# Patient Record
(demographics unavailable — no encounter records)

---

## 2025-05-30 NOTE — ASSESSMENT
[Arterial/Venous Disease] : arterial/venous disease [FreeTextEntry1] : 79yoM, former smoker with PMHx of CAD/PCI, AFib on Eliquis, PAD, s/p RLE PTA/stents, chronic LBP radiating down to his lower extremities referred by one of our patients to be evaluated for PAD. Pt with burning sensation of his bilateral LEs, R>L. Denies claudication or rest pain. On exam, both legs are well perfused, no edema or skin discoloration, diminished peripheral pulses. BL LEs arterial duplex was done in the office that demonstrating diffuse fibrocalcific plaque in femoropopliteal and tibial arteries BL. RLE with patent popliteal stent, AT is not visualized. LLE with >50% stenosis of distal SFA, popliteal artery with >75% stenosis, AT is occluded.  Discussed the findings and explained that symptoms are not vascular and most likely related to his lower back. No vascular intervention is needed at this time. He was recommended to f/u with his spine surgeon and pain management. F/u in 6 months for a surveillance duplex or sooner if necessary.

## 2025-05-30 NOTE — ADDENDUM
[FreeTextEntry1] : This note was written by Alejandrina VALDEZ, acting as a scribe for Dr. Jeferson Campbell.  I, Dr. Jeferson Campbell, have read and attest that all the information, medical decision-making, and discharge instructions within are true and accurate.  I, Dr. Jeferson Campbell, personally performed the evaluation and management (E/M) services for this new patient.  That E/M includes conducting the initial examination, assessing all conditions, and establishing the plan of care.  Today, my ACP, Alejandrina VALDEZ, was here to observe my evaluation and management services for this patient to be followed going forward.

## 2025-05-30 NOTE — HISTORY OF PRESENT ILLNESS
[FreeTextEntry1] : 79yoM, former smoker with PMHx of CAD/PCI, AFib on Eliquis, PAD, s/p RLE PTA/stents, chronic LBP radiating down to his lower extremities referred by one of our patients to be evaluated for PAD. He c/o burning leg pain that rapidly progressed over the last few months, R>L. He reports a history of RLE popliteal stent few years ago and states that he had similar symptoms at that time. He was prescribed Gabapentin and tramadol by his PCP, however his symptoms are not relieved. He recently had an epidural injection and symptoms in his left leg were relieved, however it didnt help with his right leg burning sensation. He denies claudication, rest pain, skin changes. No hx of DVT, edema.  Pt primarily lives in [place], takes care of his wife who suffers from Alzheimer's Bed in low position, brakes on/Side rails x 2 or 4 up, assess large gaps, such that a patient could get extremity or other body part entrapped, use additional safety procedures/Call light is within reach, educate patient/family on its functionality

## 2025-05-30 NOTE — REVIEW OF SYSTEMS
[As Noted in HPI] : as noted in HPI [Arthralgias] : arthralgias [Limb Pain] : limb pain [Negative] : Heme/Lymph [Leg Claudication] : no intermittent leg claudication [Lower Ext Edema] : no extremity edema

## 2025-05-30 NOTE — PHYSICAL EXAM
[Respiratory Effort] : normal respiratory effort [Normal Heart Sounds] : normal heart sounds [No Rash or Lesion] : No rash or lesion [Calm] : calm [2+] : left 2+ [0] : right 0 [1+] : left 1+ [Ankle Swelling (On Exam)] : not present [Abdomen Tenderness] : ~T ~M No abdominal tenderness [de-identified] : WN/WD, NAD [de-identified] : NC/AT [de-identified] : supple [de-identified] : FROM throughout [de-identified] : grossly intact

## 2025-05-30 NOTE — HISTORY OF PRESENT ILLNESS
[FreeTextEntry1] : 79yoM, former smoker with PMHx of CAD/PCI, AFib on Eliquis, PAD, s/p RLE PTA/stents, chronic LBP radiating down to his lower extremities referred by one of our patients to be evaluated for PAD. He c/o burning leg pain that rapidly progressed over the last few months, R>L. He reports a history of RLE popliteal stent few years ago and states that he had similar symptoms at that time. He was prescribed Gabapentin and tramadol by his PCP, however his symptoms are not relieved. He recently had an epidural injection and symptoms in his left leg were relieved, however it didnt help with his right leg burning sensation. He denies claudication, rest pain, skin changes. No hx of DVT, edema.  Pt primarily lives in [place], takes care of his wife who suffers from Alzheimer's

## 2025-05-30 NOTE — PHYSICAL EXAM
[Respiratory Effort] : normal respiratory effort [Normal Heart Sounds] : normal heart sounds [No Rash or Lesion] : No rash or lesion [Calm] : calm [2+] : left 2+ [0] : right 0 [1+] : left 1+ [Ankle Swelling (On Exam)] : not present [Abdomen Tenderness] : ~T ~M No abdominal tenderness [de-identified] : WN/WD, NAD [de-identified] : NC/AT [de-identified] : supple [de-identified] : FROM throughout [de-identified] : grossly intact

## 2025-05-30 NOTE — PHYSICAL EXAM
[Respiratory Effort] : normal respiratory effort [Normal Heart Sounds] : normal heart sounds [No Rash or Lesion] : No rash or lesion [Calm] : calm [2+] : left 2+ [0] : right 0 [1+] : left 1+ [Ankle Swelling (On Exam)] : not present [Abdomen Tenderness] : ~T ~M No abdominal tenderness [de-identified] : WN/WD, NAD [de-identified] : NC/AT [de-identified] : supple [de-identified] : FROM throughout [de-identified] : grossly intact

## 2025-05-30 NOTE — PROCEDURE
[FreeTextEntry1] : BL LEs arterial duplex was done in the office that demonstrating diffuse fibrocalcific plaque in femoropopliteal and tibial arteries BL. RLE with patent popliteal stent, AT is not visualized. LLE with >50% stenosis of distal SFA, popliteal artery with >75% stenosis, AT is occluded.